# Patient Record
Sex: FEMALE | Race: BLACK OR AFRICAN AMERICAN | Employment: FULL TIME | URBAN - METROPOLITAN AREA
[De-identification: names, ages, dates, MRNs, and addresses within clinical notes are randomized per-mention and may not be internally consistent; named-entity substitution may affect disease eponyms.]

---

## 2017-04-07 ENCOUNTER — HOSPITAL ENCOUNTER (OUTPATIENT)
Dept: MAMMOGRAPHY | Age: 52
Discharge: HOME | End: 2017-04-07
Attending: FAMILY MEDICINE
Payer: COMMERCIAL

## 2017-04-07 DIAGNOSIS — Z12.31 VISIT FOR SCREENING MAMMOGRAM: ICD-10-CM

## 2017-04-07 PROCEDURE — 77067 SCR MAMMO BI INCL CAD: CPT

## 2018-04-19 ENCOUNTER — HOSPITAL ENCOUNTER (OUTPATIENT)
Dept: MAMMOGRAPHY | Age: 53
Discharge: HOME | End: 2018-04-19
Attending: FAMILY MEDICINE
Payer: COMMERCIAL

## 2018-04-19 DIAGNOSIS — Z12.31 VISIT FOR SCREENING MAMMOGRAM: ICD-10-CM

## 2018-04-19 PROCEDURE — 77067 SCR MAMMO BI INCL CAD: CPT

## 2019-05-20 PROBLEM — M54.50 CHRONIC LEFT-SIDED LOW BACK PAIN WITHOUT SCIATICA: Status: ACTIVE | Noted: 2018-07-30

## 2019-05-20 PROBLEM — G89.29 CHRONIC LEFT-SIDED LOW BACK PAIN WITHOUT SCIATICA: Status: ACTIVE | Noted: 2018-07-30

## 2019-05-29 PROBLEM — Z23 ENCOUNTER FOR IMMUNIZATION: Status: ACTIVE | Noted: 2019-05-29

## 2019-05-29 PROBLEM — Z00.00 PREVENTATIVE HEALTH CARE: Status: ACTIVE | Noted: 2019-05-29

## 2019-05-29 PROBLEM — Z11.59 ENCOUNTER FOR HEPATITIS C SCREENING TEST FOR LOW RISK PATIENT: Status: ACTIVE | Noted: 2019-05-29

## 2019-06-05 PROBLEM — N39.0 URINARY TRACT INFECTION WITHOUT HEMATURIA: Status: ACTIVE | Noted: 2019-06-05

## 2019-06-05 PROBLEM — M25.561 CHRONIC PAIN OF BOTH KNEES: Status: ACTIVE | Noted: 2019-06-05

## 2019-06-05 PROBLEM — M25.562 CHRONIC PAIN OF BOTH KNEES: Status: ACTIVE | Noted: 2019-06-05

## 2019-06-05 PROBLEM — G89.29 CHRONIC PAIN OF BOTH KNEES: Status: ACTIVE | Noted: 2019-06-05

## 2019-06-21 PROBLEM — Z76.0 MEDICATION REFILL: Status: ACTIVE | Noted: 2019-05-29

## 2019-06-21 PROBLEM — Z01.30 BP CHECK: Status: ACTIVE | Noted: 2019-06-21

## 2019-09-19 PROBLEM — Z01.30 BP CHECK: Status: RESOLVED | Noted: 2019-06-21 | Resolved: 2019-09-19

## 2019-10-25 PROBLEM — Z01.30 BP CHECK: Status: RESOLVED | Noted: 2019-06-21 | Resolved: 2019-10-25

## 2019-12-03 PROBLEM — F17.211 CIGARETTE NICOTINE DEPENDENCE IN REMISSION: Status: ACTIVE | Noted: 2019-12-03

## 2020-01-06 PROBLEM — H65.91 MIDDLE EAR EFFUSION, RIGHT: Status: ACTIVE | Noted: 2020-01-06

## 2020-02-07 PROBLEM — H66.91 RIGHT CHRONIC OTITIS MEDIA: Status: ACTIVE | Noted: 2020-02-07

## 2020-05-13 PROBLEM — Z00.00 PREVENTATIVE HEALTH CARE: Status: ACTIVE | Noted: 2020-05-13

## 2020-06-12 PROBLEM — Z01.30 BP CHECK: Status: RESOLVED | Noted: 2019-06-21 | Resolved: 2020-06-12

## 2020-06-12 PROBLEM — Z00.00 PREVENTATIVE HEALTH CARE: Status: RESOLVED | Noted: 2020-05-13 | Resolved: 2020-06-12

## 2021-04-23 ENCOUNTER — TRANSCRIBE ORDER (OUTPATIENT)
Dept: SCHEDULING | Age: 56
End: 2021-04-23

## 2021-04-23 DIAGNOSIS — Z12.31 SCREENING MAMMOGRAM FOR HIGH-RISK PATIENT: Primary | ICD-10-CM

## 2021-04-29 ENCOUNTER — HOSPITAL ENCOUNTER (OUTPATIENT)
Dept: MAMMOGRAPHY | Age: 56
Discharge: HOME | End: 2021-04-29
Attending: FAMILY MEDICINE
Payer: COMMERCIAL

## 2021-04-29 DIAGNOSIS — Z12.31 SCREENING MAMMOGRAM FOR HIGH-RISK PATIENT: ICD-10-CM

## 2021-04-29 PROCEDURE — 77063 BREAST TOMOSYNTHESIS BI: CPT

## 2022-03-18 PROBLEM — G89.29 CHRONIC LEFT-SIDED LOW BACK PAIN WITHOUT SCIATICA: Status: ACTIVE | Noted: 2018-07-30

## 2022-03-18 PROBLEM — M25.561 CHRONIC PAIN OF BOTH KNEES: Status: ACTIVE | Noted: 2019-06-05

## 2022-03-18 PROBLEM — M25.562 CHRONIC PAIN OF BOTH KNEES: Status: ACTIVE | Noted: 2019-06-05

## 2022-03-18 PROBLEM — G89.29 CHRONIC PAIN OF BOTH KNEES: Status: ACTIVE | Noted: 2019-06-05

## 2022-03-18 PROBLEM — M54.50 CHRONIC LEFT-SIDED LOW BACK PAIN WITHOUT SCIATICA: Status: ACTIVE | Noted: 2018-07-30

## 2022-03-19 PROBLEM — Z76.0 MEDICATION REFILL: Status: ACTIVE | Noted: 2019-05-29

## 2022-03-19 PROBLEM — F17.211 CIGARETTE NICOTINE DEPENDENCE IN REMISSION: Status: ACTIVE | Noted: 2019-12-03

## 2022-03-19 PROBLEM — N39.0 URINARY TRACT INFECTION WITHOUT HEMATURIA: Status: ACTIVE | Noted: 2019-06-05

## 2022-03-19 PROBLEM — H66.91 RIGHT CHRONIC OTITIS MEDIA: Status: ACTIVE | Noted: 2020-02-07

## 2022-03-19 PROBLEM — H65.91 MIDDLE EAR EFFUSION, RIGHT: Status: ACTIVE | Noted: 2020-01-06

## 2022-05-24 ENCOUNTER — HOSPITAL ENCOUNTER (OUTPATIENT)
Dept: MAMMOGRAPHY | Age: 57
Discharge: HOME | End: 2022-05-27
Payer: COMMERCIAL

## 2022-05-24 DIAGNOSIS — Z12.31 VISIT FOR SCREENING MAMMOGRAM: ICD-10-CM

## 2022-05-24 PROCEDURE — 77063 BREAST TOMOSYNTHESIS BI: CPT

## 2022-10-17 ENCOUNTER — OFFICE VISIT (OUTPATIENT)
Dept: OCCUPATIONAL MEDICINE | Age: 57
End: 2022-10-17

## 2022-10-17 VITALS — DIASTOLIC BLOOD PRESSURE: 80 MMHG | TEMPERATURE: 98.2 F | SYSTOLIC BLOOD PRESSURE: 110 MMHG

## 2022-10-17 DIAGNOSIS — R30.9 URINARY PAIN: Primary | ICD-10-CM

## 2022-10-17 LAB
BILIRUBIN, URINE, POC: NEGATIVE
BLOOD URINE, POC: NEGATIVE
GLUCOSE URINE, POC: NEGATIVE
KETONES, URINE, POC: NEGATIVE
LEUKOCYTE ESTERASE, URINE, POC: NEGATIVE
NITRITE, URINE, POC: NORMAL
PH, URINE, POC: 8 (ref 4.6–8)
PROTEIN,URINE, POC: NORMAL
SPECIFIC GRAVITY, URINE, POC: 1.03 (ref 1–1.03)
URINALYSIS CLARITY, POC: CLEAR
URINALYSIS COLOR, POC: YELLOW
UROBILINOGEN, POC: NORMAL

## 2022-10-17 RX ORDER — TIZANIDINE 2 MG/1
2 TABLET ORAL 2 TIMES DAILY PRN
COMMUNITY
Start: 2021-12-30

## 2022-10-17 RX ORDER — NITROFURANTOIN 25; 75 MG/1; MG/1
100 CAPSULE ORAL 2 TIMES DAILY
Qty: 10 CAPSULE | Refills: 0 | Status: SHIPPED | OUTPATIENT
Start: 2022-10-17 | End: 2022-10-22

## 2022-10-17 ASSESSMENT — ENCOUNTER SYMPTOMS
GASTROINTESTINAL NEGATIVE: 1
ALLERGIC/IMMUNOLOGIC NEGATIVE: 1
EYES NEGATIVE: 1
RESPIRATORY NEGATIVE: 1

## 2022-10-17 NOTE — PROGRESS NOTES
PROGRESS NOTE    SUBJECTIVE:   Tom Chaidez is a 59 y.o. female seen for UTI x 1 week. Patient tried to come in to see me last week, but never got a chance to leave work. Associated symptoms include urinary pain/pressure, frequency, urgency, odorous urine, and left flank pain. Denies fevers, N/V, abdominal pain, discharge, itching, & blood in urine. She has not taken any medication for acute symptom management. Last UTI was at the beginning of this year. She has taken Bactrim in the past for UTIs and this does not work well for her. No other questions or concerns. Chief Complaint    Urinary Tract Infection       Urinary Tract Infection  This is a new problem. The current episode started 1 to 4 weeks ago. The problem is unchanged. Pertinent negatives include no hematuria or pain. Current Outpatient Medications   Medication Sig Dispense Refill    tiZANidine (ZANAFLEX) 2 MG tablet Take 2 mg by mouth 2 times daily as needed      nitrofurantoin, macrocrystal-monohydrate, (MACROBID) 100 MG capsule Take 1 capsule by mouth 2 times daily for 5 days 10 capsule 0     No current facility-administered medications for this visit. No Known Allergies    Social History     Tobacco Use    Smoking status: Former     Packs/day: 1.00     Types: Cigarettes     Quit date: 2014     Years since quittin.7    Smokeless tobacco: Never   Vaping Use    Vaping Use: Never used   Substance Use Topics    Alcohol use: No      Review of Systems   Constitutional: Negative. HENT: Negative. Eyes: Negative. Respiratory: Negative. Cardiovascular: Negative. Gastrointestinal: Negative. Endocrine: Negative. Genitourinary:  Positive for dysuria, flank pain, frequency and urgency. Negative for difficulty urinating, hematuria, pelvic pain, vaginal bleeding and vaginal discharge. Odorous urine   Skin: Negative. Allergic/Immunologic: Negative. Neurological: Negative. Hematological: Negative. Psychiatric/Behavioral: Negative. OBJECTIVE:  /80 (Site: Left Upper Arm, Position: Sitting)   Temp 98.2 °F (36.8 °C)      Results for orders placed or performed in visit on 10/17/22   AMB POC URINALYSIS DIP STICK MANUAL W/O MICRO   Result Value Ref Range    Color (UA POC) Yellow     Clarity (UA POC) Clear     Glucose, Urine, POC Negative Negative    Bilirubin, Urine, POC Negative Negative    Ketones, Urine, POC Negative Negative    Specific Gravity, Urine, POC 1.030 1.001 - 1.035    Blood (UA POC) Negative Negative    pH, Urine, POC 8.0 4.6 - 8.0    Protein, Urine, POC 4+ Negative    Urobilinogen, POC 0.2 mg/dL     Nitrite, Urine, POC Neg-culture sent Negative    Leukocyte Esterase, Urine, POC Negative Negative     Physical Exam  Constitutional:       General: She is not in acute distress. Appearance: Normal appearance. HENT:      Head: Normocephalic and atraumatic. Cardiovascular:      Pulses: Normal pulses. Pulmonary:      Effort: Pulmonary effort is normal.   Abdominal:      Tenderness: There is no right CVA tenderness or left CVA tenderness. Skin:     General: Skin is warm and dry. Neurological:      General: No focal deficit present. Mental Status: She is alert and oriented to person, place, and time. Psychiatric:         Mood and Affect: Mood normal.         Behavior: Behavior normal.     ASSESSMENT and Syhira Grimmsudha was seen today for urinary tract infection. Diagnoses and all orders for this visit:    Urinary pain  -     nitrofurantoin, macrocrystal-monohydrate, (MACROBID) 100 MG capsule; Take 1 capsule by mouth 2 times daily for 5 days  -     AMB POC URINALYSIS DIP STICK MANUAL W/O MICRO    Urine dipstick negative; however, patient is symptomatic and would like to take an antibiotic. Take antibiotic with food to avoid GI upset. Urine sent to [facility] for culture. Will call patient with results in 3-4 days. Drink plenty of water to flush urinary tract.  To ER/urgent care with worsening symptoms such as pelvic/abdominal/flank pain, fevers, N/V, chills. RTC PRN for questions or concerns. Counseled on benefits of having a primary care provider which includes, but is not limited to, continuity of care and having a medical home when concerns arise. Also enforced that onsite clinic policy states that we are not to take the place of a primary care provider, pt verbalized understanding. SEs and risk vs benefits associated with medications prescribed discussed with patient who verbalized understanding. Pt verbalized understanding and agreement with plan of care. RTC for persisting/worsening symptoms or new complaints that arise. Discussed signs and symptoms that would warrant immediate evaluation including, but not limited to HA, blurred vision, speech disturbance, difficulty with ambulation/gait, numbness, tingling, weakness, syncope, chest pain, or shortness of breath. I have reviewed the patient's medication list, past medical, family, social, and surgical history in detail and updated the patient record appropriately.     Orlando Boo, APRN - CNP

## 2022-11-21 ENCOUNTER — OFFICE VISIT (OUTPATIENT)
Dept: OCCUPATIONAL MEDICINE | Age: 57
End: 2022-11-21

## 2022-11-21 DIAGNOSIS — S40.861A INSECT BITE OF RIGHT UPPER ARM, INITIAL ENCOUNTER: Primary | ICD-10-CM
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
## 2022-11-21 ASSESSMENT — ENCOUNTER SYMPTOMS
GASTROINTESTINAL NEGATIVE: 1
ROS SKIN COMMENTS: BUG BITE
RESPIRATORY NEGATIVE: 1
EYES NEGATIVE: 1
ALLERGIC/IMMUNOLOGIC NEGATIVE: 1

## 2022-11-21 NOTE — PROGRESS NOTES
PROGRESS NOTE    SUBJECTIVE:   Praveena Moscoso is a 59 y.o. female seen for bug bite on right arm x 2 days. Patient states she's not sure what big her. The area is red and itchy. No fevers, ulceration, warmth, drainage, and/or bulls eye rash. Patient admits to scratching the bite often. She applied Benadryl cream but this did not help much with the itch. Insect Bite  This is a new problem. The problem has been gradually worsening. Associated symptoms include a rash. Pertinent negatives include no chills or fever. Nothing aggravates the symptoms. Treatments tried: OTC Benadryl Cream. The treatment provided no relief. Current Outpatient Medications   Medication Sig Dispense Refill    tiZANidine (ZANAFLEX) 2 MG tablet Take 2 mg by mouth 2 times daily as needed       No current facility-administered medications for this visit. No Known Allergies    Social History     Tobacco Use    Smoking status: Former     Packs/day: 1.00     Types: Cigarettes     Quit date: 2014     Years since quittin.8    Smokeless tobacco: Never   Vaping Use    Vaping Use: Never used   Substance Use Topics    Alcohol use: No      Review of Systems   Constitutional:  Negative for chills and fever. HENT: Negative. Eyes: Negative. Respiratory: Negative. Cardiovascular: Negative. Gastrointestinal: Negative. Endocrine: Negative. Genitourinary: Negative. Musculoskeletal: Negative. Skin:  Positive for rash. Bug bite   Allergic/Immunologic: Negative. Neurological: Negative. Hematological: Negative. Psychiatric/Behavioral: Negative. OBJECTIVE:    Physical Exam  Constitutional:       General: She is not in acute distress. Appearance: Normal appearance. She is not ill-appearing. Pulmonary:      Effort: Pulmonary effort is normal.   Skin:     General: Skin is warm and dry. Findings: Erythema present.              Comments: 3 cm circular area of erythema with excoriation on right deltoid. No punctate lesions, erythema migrans, streaking, warmth, or drainage noted. Neurological:      Mental Status: She is alert and oriented to person, place, and time. Psychiatric:         Mood and Affect: Mood normal.         Behavior: Behavior normal.     ASSESSMENT and PLAN    Diagnoses and all orders for this visit:    Insect bite of right upper arm, initial encounter      Apply OTC Hydrocortisone 1% BID x 2 days. RTC on Wednesday for recheck. To urgent care with increased swelling, redness, itching, warmth, and/or fevers. Counseled on benefits of having a primary care provider which includes, but is not limited to, continuity of care and having a medical home when concerns arise. Also enforced that onsite clinic policy states that we are not to take the place of a primary care provider, pt verbalized understanding. SEs and risk vs benefits associated with medications prescribed discussed with patient who verbalized understanding. Pt verbalized understanding and agreement with plan of care. RTC for persisting/worsening symptoms or new complaints that arise. Discussed signs and symptoms that would warrant immediate evaluation including, but not limited to HA, blurred vision, speech disturbance, difficulty with ambulation/gait, numbness, tingling, weakness, syncope, chest pain, or shortness of breath. I have reviewed the patient's medication list, past medical, family, social, and surgical history in detail and updated the patient record appropriately.     KATLYN Okeefe - CNP

## 2022-11-28 ENCOUNTER — OFFICE VISIT (OUTPATIENT)
Dept: OCCUPATIONAL MEDICINE | Age: 57
End: 2022-11-28

## 2022-11-28 VITALS — OXYGEN SATURATION: 97 % | HEART RATE: 82 BPM | SYSTOLIC BLOOD PRESSURE: 118 MMHG | DIASTOLIC BLOOD PRESSURE: 78 MMHG

## 2022-11-28 DIAGNOSIS — L29.9 PRURITUS OF FOREARM: ICD-10-CM

## 2022-11-28 DIAGNOSIS — L03.113 CELLULITIS OF RIGHT UPPER ARM: Primary | ICD-10-CM

## 2022-11-28 RX ORDER — PREDNISONE 20 MG/1
20 TABLET ORAL DAILY
Qty: 5 TABLET | Refills: 0 | Status: SHIPPED | OUTPATIENT
Start: 2022-11-28 | End: 2022-12-03

## 2022-11-28 RX ORDER — HYDROXYZINE HYDROCHLORIDE 25 MG/1
25 TABLET, FILM COATED ORAL NIGHTLY PRN
Qty: 7 TABLET | Refills: 0 | Status: SHIPPED | OUTPATIENT
Start: 2022-11-28 | End: 2022-12-05

## 2022-11-28 RX ORDER — DOXYCYCLINE HYCLATE 100 MG
100 TABLET ORAL 2 TIMES DAILY
Qty: 14 TABLET | Refills: 0 | Status: SHIPPED | OUTPATIENT
Start: 2022-11-28 | End: 2022-12-05

## 2022-11-28 ASSESSMENT — ENCOUNTER SYMPTOMS: COLOR CHANGE: 0

## 2022-11-28 NOTE — PROGRESS NOTES
PROGRESS NOTE    SUBJECTIVE:   Luna Proctor is a 59 y.o. female seen for rash. Chief Complaint    Rash         Ms. Emily Rangel returns to the clinic after being seen last week for a rash on her right upper arm. She relays that she has been using the OTC hydrocortisone cream and Benadryl for itching without relief. She now endorses swelling to her right middle and index fingers with a rash-like place on her right wrist. She is unsure if she was bitten or exposed to something new. She denies fever, streaking, or swelling to arm. Rash  This is a new problem. The current episode started 1 to 4 weeks ago. The problem is unchanged. The affected locations include the right arm. The rash is characterized by itchiness, redness and dryness. It is unknown if there was an exposure to a precipitant. Pertinent negatives include no fever. Past treatments include anti-itch cream and topical steroids. The treatment provided no relief. Current Outpatient Medications   Medication Sig Dispense Refill    doxycycline hyclate (VIBRA-TABS) 100 MG tablet Take 1 tablet by mouth 2 times daily for 7 days 14 tablet 0    predniSONE (DELTASONE) 20 MG tablet Take 1 tablet by mouth daily for 5 days 5 tablet 0    hydrOXYzine HCl (ATARAX) 25 MG tablet Take 1 tablet by mouth nightly as needed for Itching 7 tablet 0    tiZANidine (ZANAFLEX) 2 MG tablet Take 2 mg by mouth 2 times daily as needed       No current facility-administered medications for this visit. No Known Allergies    Social History     Tobacco Use    Smoking status: Former     Packs/day: 1.00     Types: Cigarettes     Quit date: 2014     Years since quittin.9    Smokeless tobacco: Never   Vaping Use    Vaping Use: Never used   Substance Use Topics    Alcohol use: No        Review of Systems   Constitutional:  Negative for chills and fever. Skin:  Positive for rash. Negative for color change. Denies drainage, edema, streaking to entire arm.  Endorses swelling to right middle and index fingers. OBJECTIVE:  /78 (Site: Left Upper Arm)   Pulse 82   SpO2 97%      No results found for this visit on 11/28/22. Physical Exam  Constitutional:       Appearance: Normal appearance. Cardiovascular:      Rate and Rhythm: Normal rate and regular rhythm. Pulmonary:      Effort: Pulmonary effort is normal.   Skin:            Comments: There is an erythematous, macular rash to the right upper arm. There appears to be some discoloration of the skin over the site. Excoriation noted. Right index and middle fingers are slightly swollen. There is erythema to her right wrist area. No drainage noted to arm. No generalized edema to arm. Pulse intact +2 radial.    Neurological:      Mental Status: She is alert. ASSESSMENT and Ekta Schroeder was seen today for rash. Diagnoses and all orders for this visit:    Cellulitis of right upper arm  Comments:  Doxycycline and Prednisone prescribed. RTC on Wednesday for recheck. Orders:  -     doxycycline hyclate (VIBRA-TABS) 100 MG tablet; Take 1 tablet by mouth 2 times daily for 7 days  -     predniSONE (DELTASONE) 20 MG tablet; Take 1 tablet by mouth daily for 5 days    Pruritus of forearm  Comments:  Atarax at night. Can use Benadryl during the day-patient relays it does not make her drowsy. Orders:  -     hydrOXYzine HCl (ATARAX) 25 MG tablet; Take 1 tablet by mouth nightly as needed for Itching    Possible cellulitis forming at right upper arm. Reviewed Doxycycline and Prednisone with patient along with side effects. Reviewed Atarax-take at night only. Stop OTC hydrocortisone cream. RTC in two days. Go to ER if experiencing any of the following but not limited to: fever, streaking, swelling of hand/arm, new onset pain to arm. Patient verbalized understanding.      Counseled on benefits of having a primary care provider which includes, but is not limited to, continuity of care and having a medical home when concerns arise. Also enforced that onsite clinic policy states that we are not to take the place of a primary care provider, pt verbalized understanding. SEs and risk vs benefits associated with medications prescribed discussed with patient who verbalized understanding. Pt verbalized understanding and agreement with plan of care. RTC for persisting/worsening symptoms or new complaints that arise. Discussed signs and symptoms that would warrant immediate evaluation including, but not limited to HA, blurred vision, speech disturbance, difficulty with ambulation/gait, numbness, tingling, weakness, syncope, chest pain, or shortness of breath. I have reviewed the patient's medication list, past medical, family, social, and surgical history in detail and updated the patient record appropriately.     Alfred Martinez, KATLYN - CNP

## 2022-12-05 ENCOUNTER — OFFICE VISIT (OUTPATIENT)
Dept: OCCUPATIONAL MEDICINE | Age: 57
End: 2022-12-05

## 2022-12-05 VITALS
TEMPERATURE: 98.5 F | SYSTOLIC BLOOD PRESSURE: 124 MMHG | HEART RATE: 78 BPM | OXYGEN SATURATION: 98 % | DIASTOLIC BLOOD PRESSURE: 80 MMHG

## 2022-12-05 DIAGNOSIS — R05.1 ACUTE COUGH: ICD-10-CM

## 2022-12-05 DIAGNOSIS — J30.2 SEASONAL ALLERGIES: Primary | ICD-10-CM

## 2022-12-05 DIAGNOSIS — R09.81 NASAL CONGESTION: ICD-10-CM

## 2022-12-05 LAB
INFLUENZA A ANTIGEN, POC: NEGATIVE
INFLUENZA B ANTIGEN, POC: NEGATIVE
SARS-COV-2 RNA, POC: NEGATIVE

## 2022-12-05 RX ORDER — FLUTICASONE PROPIONATE 50 MCG
2 SPRAY, SUSPENSION (ML) NASAL DAILY
Qty: 16 G | Refills: 0 | Status: SHIPPED | OUTPATIENT
Start: 2022-12-05

## 2022-12-05 ASSESSMENT — ENCOUNTER SYMPTOMS
SWOLLEN GLANDS: 0
HOARSE VOICE: 0
COUGH: 1
SINUS PAIN: 0
EYE ITCHING: 1
SORE THROAT: 0
SINUS PRESSURE: 0
WHEEZING: 0
SHORTNESS OF BREATH: 0
GASTROINTESTINAL NEGATIVE: 1
RHINORRHEA: 1
CHEST TIGHTNESS: 0

## 2022-12-05 NOTE — PROGRESS NOTES
PROGRESS NOTE    SUBJECTIVE:   Antoine Mei is a 59 y.o. female seen for sneezing, nasal congestion, and watery eyes that started yesterday. Patient would like to tested for COVID/FLU. Chief Complaint    Allergies       Allergies  Presents for initial visit. She complains of congestion, cough, eye itching, itchy nose, rhinorrhea and sneezing. She reports no ear pain, fatigue, fever, headaches, hoarse voice, plugged ear sensation, rash, sinus pressure, sore throat, swollen glands or wheezing. The problem occurs constantly. Symptom severity has been mild. Symptoms are perennial with seasonal flares. Current Outpatient Medications   Medication Sig Dispense Refill    fluticasone (FLONASE) 50 MCG/ACT nasal spray 2 sprays by Each Nostril route daily 16 g 0    doxycycline hyclate (VIBRA-TABS) 100 MG tablet Take 1 tablet by mouth 2 times daily for 7 days 14 tablet 0    tiZANidine (ZANAFLEX) 2 MG tablet Take 2 mg by mouth 2 times daily as needed      hydrOXYzine HCl (ATARAX) 25 MG tablet Take 1 tablet by mouth nightly as needed for Itching (Patient not taking: Reported on 2022) 7 tablet 0     No current facility-administered medications for this visit. No Known Allergies    Social History     Tobacco Use    Smoking status: Former     Packs/day: 1.00     Types: Cigarettes     Quit date: 2014     Years since quittin.9    Smokeless tobacco: Never   Vaping Use    Vaping Use: Never used   Substance Use Topics    Alcohol use: No      Review of Systems   Constitutional: Negative. Negative for fatigue and fever. HENT:  Positive for congestion, postnasal drip, rhinorrhea and sneezing. Negative for ear discharge, ear pain, hoarse voice, sinus pressure, sinus pain and sore throat. Eyes:  Positive for itching. Respiratory:  Positive for cough. Negative for chest tightness, shortness of breath and wheezing. Cardiovascular: Negative. Gastrointestinal: Negative. Endocrine: Negative. Genitourinary: Negative. Musculoskeletal: Negative. Skin:  Negative for rash. Allergic/Immunologic: Positive for environmental allergies. Neurological:  Negative for headaches. Hematological: Negative. Psychiatric/Behavioral: Negative. OBJECTIVE:  /80 (Site: Left Upper Arm, Position: Sitting)   Pulse 78   Temp 98.5 °F (36.9 °C) (Oral)   SpO2 98%      Results for orders placed or performed in visit on 12/05/22   AMB POC SARS-COV-2 AND INFLUENZA A/B   Result Value Ref Range    SARS-COV-2 RNA, POC Negative     Influenza A Antigen, POC Negative Negative    Influenza B Antigen, POC Negative Negative     Physical Exam  Vitals reviewed. Constitutional:       Appearance: Normal appearance. HENT:      Head: Normocephalic and atraumatic. Right Ear: Ear canal and external ear normal.      Left Ear: Ear canal and external ear normal.      Nose: Congestion present. No rhinorrhea. Mouth/Throat:      Mouth: Mucous membranes are moist.      Pharynx: Oropharynx is clear. No oropharyngeal exudate or posterior oropharyngeal erythema. Eyes:      Conjunctiva/sclera: Conjunctivae normal.   Cardiovascular:      Rate and Rhythm: Normal rate and regular rhythm. Pulses: Normal pulses. Heart sounds: Normal heart sounds. Skin:     General: Skin is warm and dry. Neurological:      Mental Status: She is alert and oriented to person, place, and time. Psychiatric:         Mood and Affect: Mood normal.         Behavior: Behavior normal.     ASSESSMENT and Shelley Vazquez was seen today for allergies. Diagnoses and all orders for this visit:    Seasonal allergies  -     fluticasone (FLONASE) 50 MCG/ACT nasal spray; 2 sprays by Each Nostril route daily    Nasal congestion  -     AMB POC SARS-COV-2 AND INFLUENZA A/B    Acute cough  -     AMB POC SARS-COV-2 AND INFLUENZA A/B    COVID/FLU negative. Recommend Flonase per packaging instructions.  Can try honey (1 tsp) TID as needed for cough. RTC if symptoms worsen or fail to improve. Counseled on benefits of having a primary care provider which includes, but is not limited to, continuity of care and having a medical home when concerns arise. Also enforced that onsite clinic policy states that we are not to take the place of a primary care provider, pt verbalized understanding. SEs and risk vs benefits associated with medications prescribed discussed with patient who verbalized understanding. Pt verbalized understanding and agreement with plan of care. RTC for persisting/worsening symptoms or new complaints that arise. Discussed signs and symptoms that would warrant immediate evaluation including, but not limited to HA, blurred vision, speech disturbance, difficulty with ambulation/gait, numbness, tingling, weakness, syncope, chest pain, or shortness of breath. I have reviewed the patient's medication list, past medical, family, social, and surgical history in detail and updated the patient record appropriately.     Rebecca Gilbert, APRN - CNP

## 2023-02-06 ENCOUNTER — OFFICE VISIT (OUTPATIENT)
Dept: OCCUPATIONAL MEDICINE | Age: 58
End: 2023-02-06

## 2023-02-06 VITALS
DIASTOLIC BLOOD PRESSURE: 80 MMHG | OXYGEN SATURATION: 97 % | SYSTOLIC BLOOD PRESSURE: 100 MMHG | TEMPERATURE: 98.3 F | HEART RATE: 91 BPM

## 2023-02-06 DIAGNOSIS — J06.9 UPPER RESPIRATORY TRACT INFECTION, UNSPECIFIED TYPE: Primary | ICD-10-CM

## 2023-02-06 DIAGNOSIS — R05.1 ACUTE COUGH: ICD-10-CM

## 2023-02-06 RX ORDER — CETIRIZINE HYDROCHLORIDE 10 MG/1
10 TABLET ORAL DAILY
COMMUNITY

## 2023-02-06 ASSESSMENT — ENCOUNTER SYMPTOMS
RHINORRHEA: 1
SHORTNESS OF BREATH: 0
VOMITING: 0
SWOLLEN GLANDS: 0
COUGH: 1
DIARRHEA: 0
EYES NEGATIVE: 1
SORE THROAT: 1
SINUS PAIN: 0
NAUSEA: 0
ABDOMINAL PAIN: 0
WHEEZING: 0
CHEST TIGHTNESS: 0
SINUS PRESSURE: 0

## 2023-02-06 NOTE — PROGRESS NOTES
PROGRESS NOTE    SUBJECTIVE:     Sandra Lima is a 59 y.o. female seen for:    Chief Complaint    Cough       URI   Associated symptoms include coughing, headaches, rhinorrhea, sneezing and a sore throat. Pertinent negatives include no abdominal pain, chest pain, congestion, diarrhea, dysuria, ear pain, joint pain, joint swelling, nausea, neck pain, plugged ear sensation, rash, sinus pain, swollen glands, vomiting or wheezing. She has tried acetaminophen for the symptoms. The treatment provided mild relief. Patient's most bothersome symptom is cough with sputum. No history of lung disease. Patient has been around a sick co-worker. Current Outpatient Medications   Medication Sig Dispense Refill    cetirizine (ZYRTEC) 10 MG tablet Take 10 mg by mouth daily      fluticasone (FLONASE) 50 MCG/ACT nasal spray 2 sprays by Each Nostril route daily 16 g 0    tiZANidine (ZANAFLEX) 2 MG tablet Take 2 mg by mouth 2 times daily as needed       No current facility-administered medications for this visit. No Known Allergies    Social History     Tobacco Use    Smoking status: Former     Packs/day: 1.00     Types: Cigarettes     Quit date: 2014     Years since quittin.1    Smokeless tobacco: Never   Vaping Use    Vaping Use: Never used   Substance Use Topics    Alcohol use: No      Review of Systems   Constitutional: Negative. HENT:  Positive for rhinorrhea, sneezing and sore throat. Negative for congestion, ear discharge, ear pain, postnasal drip, sinus pressure and sinus pain. Eyes: Negative. Respiratory:  Positive for cough. Negative for chest tightness, shortness of breath and wheezing. Cardiovascular:  Negative for chest pain. Gastrointestinal:  Negative for abdominal pain, diarrhea, nausea and vomiting. Endocrine: Negative. Genitourinary:  Negative for dysuria. Musculoskeletal:  Negative for arthralgias, joint pain, myalgias and neck pain. Skin:  Negative for rash. Allergic/Immunologic: Positive for environmental allergies. Neurological:  Positive for headaches. Hematological: Negative. Psychiatric/Behavioral: Negative. OBJECTIVE:    /80 (Site: Left Upper Arm, Position: Sitting)   Pulse 91   Temp 98.3 °F (36.8 °C) (Oral)   SpO2 97%      Results for orders placed or performed in visit on 02/06/23   AMB POC SARS-COV-2 AND INFLUENZA A/B   Result Value Ref Range    SARS-COV-2 RNA, POC Negative     Influenza A Antigen, POC Negative Negative    Influenza B Antigen, POC Negative Negative     Physical Exam  Vitals reviewed. Constitutional:       General: She is not in acute distress. Appearance: Normal appearance. HENT:      Head: Normocephalic and atraumatic. Right Ear: Tympanic membrane, ear canal and external ear normal.      Left Ear: Tympanic membrane, ear canal and external ear normal.      Nose: Congestion and rhinorrhea present. Mouth/Throat:      Mouth: Mucous membranes are moist.      Pharynx: Oropharynx is clear. Posterior oropharyngeal erythema present. No oropharyngeal exudate. Eyes:      Conjunctiva/sclera: Conjunctivae normal.   Cardiovascular:      Rate and Rhythm: Normal rate and regular rhythm. Pulses: Normal pulses. Heart sounds: Normal heart sounds. Pulmonary:      Effort: Pulmonary effort is normal.      Breath sounds: Normal breath sounds. Skin:     General: Skin is warm and dry. Neurological:      Mental Status: She is alert and oriented to person, place, and time. Psychiatric:         Mood and Affect: Mood normal.         Behavior: Behavior normal.     ASSESSMENT and Acosta David was seen today for cough. Diagnoses and all orders for this visit:    Upper respiratory tract infection, unspecified type    Acute cough  -     AMB POC SARS-COV-2 AND INFLUENZA A/B    COVID/FLU negative. Continue Flonase and Zyrtec for seasonal allergies.  Add Mucinex-DM to thin/loosen chest congestion and help with cough- drink plenty of water with this medication. May have tested too early- RTC on Wed for retest. Follow up with PCP for routine care. Counseled on benefits of having a primary care provider which includes, but is not limited to, continuity of care and having a medical home when concerns arise. Also enforced that onsite clinic policy states that we are not to take the place of a primary care provider, pt verbalized understanding. SEs and risk vs benefits associated with medications prescribed discussed with patient who verbalized understanding. Pt verbalized understanding and agreement with plan of care. RTC for persisting/worsening symptoms or new complaints that arise. Discussed signs and symptoms that would warrant immediate evaluation including, but not limited to HA, blurred vision, speech disturbance, difficulty with ambulation/gait, numbness, tingling, weakness, syncope, chest pain, or shortness of breath. I have reviewed the patient's medication list, past medical, family, social, and surgical history in detail and updated the patient record appropriately.     Juan Leach, APRN - CNP

## 2023-04-07 ENCOUNTER — TRANSCRIBE ORDERS (OUTPATIENT)
Dept: SCHEDULING | Age: 58
End: 2023-04-07

## 2023-04-07 DIAGNOSIS — Z12.31 VISIT FOR SCREENING MAMMOGRAM: Primary | ICD-10-CM

## 2023-06-30 LAB
25(OH)D3+25(OH)D2 SERPL-MCNC: 15.9 NG/ML (ref 30–100)
ALBUMIN SERPL-MCNC: 4.1 G/DL (ref 3.8–4.9)
ALBUMIN/GLOB SERPL: 1.4 {RATIO} (ref 1.2–2.2)
ALP SERPL-CCNC: 113 IU/L (ref 44–121)
ALT SERPL-CCNC: 13 IU/L (ref 0–32)
AST SERPL-CCNC: 16 IU/L (ref 0–40)
BASOPHILS # BLD AUTO: 0 X10E3/UL (ref 0–0.2)
BASOPHILS NFR BLD AUTO: 1 %
BILIRUB SERPL-MCNC: 0.4 MG/DL (ref 0–1.2)
BUN SERPL-MCNC: 11 MG/DL (ref 6–24)
BUN/CREAT SERPL: 18 (ref 9–23)
CALCIUM SERPL-MCNC: 9.2 MG/DL (ref 8.7–10.2)
CHLORIDE SERPL-SCNC: 107 MMOL/L (ref 96–106)
CHOLEST SERPL-MCNC: 243 MG/DL (ref 100–199)
CO2 SERPL-SCNC: 21 MMOL/L (ref 20–29)
CREAT SERPL-MCNC: 0.62 MG/DL (ref 0.57–1)
EGFRCR SERPLBLD CKD-EPI 2021: 103 ML/MIN/1.73
EOSINOPHIL # BLD AUTO: 0 X10E3/UL (ref 0–0.4)
EOSINOPHIL NFR BLD AUTO: 1 %
ERYTHROCYTE [DISTWIDTH] IN BLOOD BY AUTOMATED COUNT: 13.3 % (ref 11.7–15.4)
GLOBULIN SER CALC-MCNC: 3 G/DL (ref 1.5–4.5)
GLUCOSE SERPL-MCNC: 93 MG/DL (ref 70–99)
HBA1C MFR BLD: 5.8 % (ref 4.8–5.6)
HCT VFR BLD AUTO: 38.6 % (ref 34–46.6)
HDLC SERPL-MCNC: 64 MG/DL
HGB BLD-MCNC: 12.5 G/DL (ref 11.1–15.9)
IMM GRANULOCYTES # BLD AUTO: 0 X10E3/UL (ref 0–0.1)
IMM GRANULOCYTES NFR BLD AUTO: 0 %
LDLC SERPL CALC-MCNC: 154 MG/DL (ref 0–99)
LYMPHOCYTES # BLD AUTO: 1.4 X10E3/UL (ref 0.7–3.1)
LYMPHOCYTES NFR BLD AUTO: 42 %
MCH RBC QN AUTO: 29.5 PG (ref 26.6–33)
MCHC RBC AUTO-ENTMCNC: 32.4 G/DL (ref 31.5–35.7)
MCV RBC AUTO: 91 FL (ref 79–97)
MONOCYTES # BLD AUTO: 0.2 X10E3/UL (ref 0.1–0.9)
MONOCYTES NFR BLD AUTO: 5 %
NEUTROPHILS # BLD AUTO: 1.8 X10E3/UL (ref 1.4–7)
NEUTROPHILS NFR BLD AUTO: 51 %
PLATELET # BLD AUTO: 242 X10E3/UL (ref 150–450)
POTASSIUM SERPL-SCNC: 4.1 MMOL/L (ref 3.5–5.2)
PROT SERPL-MCNC: 7.1 G/DL (ref 6–8.5)
RBC # BLD AUTO: 4.24 X10E6/UL (ref 3.77–5.28)
SODIUM SERPL-SCNC: 144 MMOL/L (ref 134–144)
TRIGL SERPL-MCNC: 142 MG/DL (ref 0–149)
TSH SERPL DL<=0.005 MIU/L-ACNC: 0.53 UIU/ML (ref 0.45–4.5)
VLDLC SERPL CALC-MCNC: 25 MG/DL (ref 5–40)
WBC # BLD AUTO: 3.4 X10E3/UL (ref 3.4–10.8)

## 2023-07-05 ENCOUNTER — OFFICE VISIT (OUTPATIENT)
Dept: OCCUPATIONAL MEDICINE | Age: 58
End: 2023-07-05

## 2023-07-05 DIAGNOSIS — Z71.2 ENCOUNTER TO DISCUSS TEST RESULTS: Primary | ICD-10-CM

## 2023-07-05 DIAGNOSIS — R73.09 ELEVATED HEMOGLOBIN A1C: ICD-10-CM

## 2023-07-05 DIAGNOSIS — E78.00 ELEVATED CHOLESTEROL: ICD-10-CM

## 2023-07-05 DIAGNOSIS — E55.9 VITAMIN D DEFICIENCY: ICD-10-CM

## 2023-07-05 RX ORDER — ERGOCALCIFEROL 1.25 MG/1
50000 CAPSULE ORAL WEEKLY
Qty: 12 CAPSULE | Refills: 1 | Status: SHIPPED | OUTPATIENT
Start: 2023-07-05

## 2023-07-05 NOTE — PROGRESS NOTES
Granulocytes 0 Not Estab. %    Immature Grans (Abs) 0.0 0.0 - 0.1 x10E3/uL   Comprehensive Metabolic Panel   Result Value Ref Range    Glucose 93 70 - 99 mg/dL    BUN 11 6 - 24 mg/dL    Creatinine 0.62 0.57 - 1.00 mg/dL    Est, Glomerular Filtration Rate 103 >59 mL/min/1.73    BUN/Creatinine Ratio 18 9 - 23    Sodium 144 134 - 144 mmol/L    Potassium 4.1 3.5 - 5.2 mmol/L    Chloride 107 (H) 96 - 106 mmol/L    CO2 21 20 - 29 mmol/L    Calcium 9.2 8.7 - 10.2 mg/dL    Total Protein 7.1 6.0 - 8.5 g/dL    Albumin 4.1 3.8 - 4.9 g/dL    Globulin, Total 3.0 1.5 - 4.5 g/dL    Albumin/Globulin Ratio 1.4 1.2 - 2.2    Total Bilirubin 0.4 0.0 - 1.2 mg/dL    Alkaline Phosphatase 113 44 - 121 IU/L    AST 16 0 - 40 IU/L    ALT 13 0 - 32 IU/L   Lipid Panel   Result Value Ref Range    Cholesterol 243 (H) 100 - 199 mg/dL    Triglycerides 142 0 - 149 mg/dL    HDL 64 >39 mg/dL    VLDL Cholesterol Calculated 25 5 - 40 mg/dL    LDL Calculated 154 (H) 0 - 99 mg/dL   TSH reflex to T4F   Result Value Ref Range    TSH 0.532 0.450 - 4.500 uIU/mL   Hemoglobin A1C   Result Value Ref Range    Hemoglobin A1C 5.8 (H) 4.8 - 5.6 %   Vitamin D 25 Hydroxy   Result Value Ref Range    Vit D, 25-Hydroxy 15.9 (L) 30.0 - 100.0 ng/mL     Physical Exam  Constitutional:       General: She is not in acute distress. Appearance: Normal appearance. She is not ill-appearing, toxic-appearing or diaphoretic. Pulmonary:      Effort: Pulmonary effort is normal.   Neurological:      Mental Status: She is alert and oriented to person, place, and time. Psychiatric:         Mood and Affect: Mood normal.         Behavior: Behavior normal.     ASSESSMENT and Dudley Tate was seen today for discuss labs. Diagnoses and all orders for this visit:    Vitamin D deficiency  -vitamin D (ERGOCALCIFEROL) 1.25 MG (95944 UT) CAPS capsule;  Take 1 capsule by mouth once a week  -Recheck around 10/5/23    Elevated cholesterol  -ASCDV Risk: 3.6%, no statin recommended at this

## 2023-07-31 ENCOUNTER — OFFICE VISIT (OUTPATIENT)
Dept: OCCUPATIONAL MEDICINE | Age: 58
End: 2023-07-31

## 2023-07-31 VITALS — TEMPERATURE: 98.2 F

## 2023-07-31 DIAGNOSIS — R30.9 URINARY PAIN: ICD-10-CM

## 2023-07-31 DIAGNOSIS — N30.00 ACUTE CYSTITIS WITHOUT HEMATURIA: Primary | ICD-10-CM

## 2023-07-31 DIAGNOSIS — T36.95XA ANTIBIOTIC-INDUCED YEAST INFECTION: ICD-10-CM

## 2023-07-31 DIAGNOSIS — B37.9 ANTIBIOTIC-INDUCED YEAST INFECTION: ICD-10-CM

## 2023-07-31 LAB
BILIRUBIN, URINE, POC: NEGATIVE
BLOOD URINE, POC: ABNORMAL
GLUCOSE URINE, POC: NEGATIVE
KETONES, URINE, POC: ABNORMAL
LEUKOCYTE ESTERASE, URINE, POC: ABNORMAL
NITRITE, URINE, POC: ABNORMAL
PH, URINE, POC: 5 (ref 4.6–8)
PROTEIN,URINE, POC: ABNORMAL
SPECIFIC GRAVITY, URINE, POC: 1.03 (ref 1–1.03)
URINALYSIS CLARITY, POC: CLEAR
URINALYSIS COLOR, POC: YELLOW
UROBILINOGEN, POC: NORMAL

## 2023-07-31 RX ORDER — FLUCONAZOLE 150 MG/1
TABLET ORAL
Qty: 2 TABLET | Refills: 0 | Status: SHIPPED | OUTPATIENT
Start: 2023-07-31

## 2023-07-31 RX ORDER — NITROFURANTOIN 25; 75 MG/1; MG/1
100 CAPSULE ORAL 2 TIMES DAILY
Qty: 10 CAPSULE | Refills: 0 | Status: SHIPPED | OUTPATIENT
Start: 2023-07-31 | End: 2023-08-05

## 2023-07-31 ASSESSMENT — ENCOUNTER SYMPTOMS
RESPIRATORY NEGATIVE: 1
EYES NEGATIVE: 1
ALLERGIC/IMMUNOLOGIC NEGATIVE: 1
GASTROINTESTINAL NEGATIVE: 1

## 2023-07-31 NOTE — PROGRESS NOTES
no right CVA tenderness, left CVA tenderness, guarding or rebound. Skin:     General: Skin is warm and dry. Neurological:      Mental Status: She is alert and oriented to person, place, and time. Psychiatric:         Mood and Affect: Mood normal.         Behavior: Behavior normal.     ASSESSMENT and Jay Sierra was seen today for urinary tract infection. Diagnoses and all orders for this visit:    Acute cystitis without hematuria  -     nitrofurantoin, macrocrystal-monohydrate, (MACROBID) 100 MG capsule; Take 1 capsule by mouth 2 times daily for 5 days    Urinary pain  -     AMB POC URINALYSIS DIP STICK MANUAL W/O MICRO    Antibiotic-induced yeast infection  -     fluconazole (DIFLUCAN) 150 MG tablet; Take 1 tablet by mouth once, then take 1 tablet by mouth 72 hours later if symptoms persist.    Urinalysis = positive for trace leukocytes, blood, and protein. Urine sent for culture. Take Macrobid with food to avoid GI upset. Return to clinic if symptoms worsen or fail to improve with treatment. Instructed patient to follow up with Gynecologist for routine vaginal exam.     Fallon Khan on benefits of having a primary care provider which includes, but is not limited to, continuity of care and having a medical home when concerns arise. Also enforced that onsite clinic policy states that we are not to take the place of a primary care provider, pt verbalized understanding. SEs and risk vs benefits associated with medications prescribed discussed with patient who verbalized understanding. Pt verbalized understanding and agreement with plan of care. RTC for persisting/worsening symptoms or new complaints that arise. Discussed signs and symptoms that would warrant immediate evaluation including, but not limited to HA, blurred vision, speech disturbance, difficulty with ambulation/gait, numbness, tingling, weakness, syncope, chest pain, or shortness of breath.     I have reviewed the patient's medication list,

## 2023-08-02 LAB — BACTERIA UR CULT: NORMAL

## 2024-01-17 ENCOUNTER — OFFICE VISIT (OUTPATIENT)
Age: 59
End: 2024-01-17

## 2024-01-17 VITALS — HEART RATE: 70 BPM | OXYGEN SATURATION: 100 % | TEMPERATURE: 98.1 F

## 2024-01-17 DIAGNOSIS — J06.9 VIRAL UPPER RESPIRATORY TRACT INFECTION: Primary | ICD-10-CM

## 2024-01-17 LAB
EXP DATE SOLUTION: NORMAL
EXP DATE SWAB: NORMAL
EXPIRATION DATE: NORMAL
LOT NUMBER POC: NORMAL
LOT NUMBER SOLUTION: NORMAL
LOT NUMBER SWAB: NORMAL
SARS-COV-2 RNA, POC: NEGATIVE

## 2024-01-17 NOTE — PROGRESS NOTES
Follow up with PCP for routine physical.    Counseled on benefits of having a primary care provider which includes, but is not limited to, continuity of care and having a medical home when concerns arise. Also enforced that onsite clinic policy states that we are not to take the place of a primary care provider, pt verbalized understanding.     SEs and risk vs benefits associated with medications prescribed discussed with patient who verbalized understanding. Pt verbalized understanding and agreement with plan of care. RTC for persisting/worsening symptoms or new complaints that arise. Discussed signs and symptoms that would warrant immediate evaluation including, but not limited to HA, blurred vision, speech disturbance, difficulty with ambulation/gait, numbness, tingling, weakness, syncope, chest pain, or shortness of breath.    I have reviewed the patient's medication list, past medical, family, social, and surgical history in detail and updated the patient record appropriately.    Dina Cervantes, APRN - CNP

## 2024-01-19 ASSESSMENT — ENCOUNTER SYMPTOMS
RHINORRHEA: 1
SWOLLEN GLANDS: 0
SORE THROAT: 1
ALLERGIC/IMMUNOLOGIC NEGATIVE: 1
VOMITING: 0
NAUSEA: 0
SHORTNESS OF BREATH: 0
CHEST TIGHTNESS: 0
SINUS PRESSURE: 0
ABDOMINAL PAIN: 0
WHEEZING: 0
SINUS PAIN: 0
EYES NEGATIVE: 1
DIARRHEA: 0
COUGH: 1

## 2024-04-11 ENCOUNTER — HOSPITAL ENCOUNTER (OUTPATIENT)
Dept: MAMMOGRAPHY | Age: 59
Discharge: HOME | End: 2024-04-11
Payer: COMMERCIAL

## 2024-04-11 VITALS — BODY MASS INDEX: 24.78 KG/M2 | WEIGHT: 177 LBS | HEIGHT: 71 IN

## 2024-04-11 PROCEDURE — 77063 BREAST TOMOSYNTHESIS BI: CPT

## 2024-04-11 PROCEDURE — 77067 SCR MAMMO BI INCL CAD: CPT

## 2024-05-29 ENCOUNTER — OFFICE VISIT (OUTPATIENT)
Age: 59
End: 2024-05-29

## 2024-05-29 DIAGNOSIS — L23.9 ALLERGIC CONTACT DERMATITIS, UNSPECIFIED TRIGGER: Primary | ICD-10-CM

## 2024-05-29 RX ORDER — TRIAMCINOLONE ACETONIDE 5 MG/G
CREAM TOPICAL
Qty: 15 G | Refills: 0 | Status: SHIPPED | OUTPATIENT
Start: 2024-05-29 | End: 2024-06-05

## 2024-05-30 NOTE — PROGRESS NOTES
PROGRESS NOTE    SUBJECTIVE:     Neha Shrestha is a 58 y.o. female seen for:    Chief Complaint    Rash       Patient complains of rash on left ankle that started yesterday while at work. The rash is red, raised, and very itchy. Associated symptoms include left ankle swelling, but no pain. Patient unsure if she was bit by an insect. She has applied OTC hydrocortisone cream once without much improvement. No other questions or concerns.     Current Outpatient Medications   Medication Sig Dispense Refill    triamcinolone (ARISTOCORT) 0.5 % cream Apply topically to affected area of skin on left ankle twice daily for 7 days 15 g 0    vitamin D (ERGOCALCIFEROL) 1.25 MG (75765 UT) CAPS capsule Take 1 capsule by mouth once a week (Patient not taking: Reported on 1/17/2024) 12 capsule 1     No current facility-administered medications for this visit.      No Known Allergies    Social History     Tobacco Use    Smoking status: Former     Current packs/day: 0.00     Types: Cigarettes     Quit date: 1/1/2014     Years since quitting: 10.4    Smokeless tobacco: Never   Vaping Use    Vaping Use: Never used   Substance Use Topics    Alcohol use: No      Review of Systems   Skin:  Positive for rash.   All other systems reviewed and are negative.    OBJECTIVE:    Physical Exam  Constitutional:       General: She is not in acute distress.     Appearance: Normal appearance. She is not ill-appearing, toxic-appearing or diaphoretic.   Pulmonary:      Effort: Pulmonary effort is normal.   Skin:     General: Skin is warm and dry.             Comments: Erythematous macular papular rash on left lateral malleolus. There is mild non-pitting edema noted. No punctum, blisters, drainage, or warmth noted.    Neurological:      General: No focal deficit present.      Mental Status: She is alert and oriented to person, place, and time.   Psychiatric:         Mood and Affect: Mood normal.         Behavior: Behavior normal.     ASSESSMENT and

## 2024-10-23 ENCOUNTER — OFFICE VISIT (OUTPATIENT)
Age: 59
End: 2024-10-23

## 2024-10-23 VITALS
DIASTOLIC BLOOD PRESSURE: 80 MMHG | SYSTOLIC BLOOD PRESSURE: 118 MMHG | TEMPERATURE: 98.3 F | HEART RATE: 95 BPM | OXYGEN SATURATION: 98 % | RESPIRATION RATE: 14 BRPM

## 2024-10-23 DIAGNOSIS — J30.2 SEASONAL ALLERGIC RHINITIS, UNSPECIFIED TRIGGER: Primary | ICD-10-CM

## 2024-10-23 DIAGNOSIS — J02.9 SORE THROAT: ICD-10-CM

## 2024-10-23 RX ORDER — CETIRIZINE HYDROCHLORIDE 10 MG/1
10 TABLET ORAL DAILY
COMMUNITY

## 2024-10-23 RX ORDER — BROMPHENIRAMINE MALEATE, PSEUDOEPHEDRINE HYDROCHLORIDE, AND DEXTROMETHORPHAN HYDROBROMIDE 2; 30; 10 MG/5ML; MG/5ML; MG/5ML
5 SYRUP ORAL 4 TIMES DAILY PRN
Qty: 120 ML | Refills: 0 | Status: SHIPPED | OUTPATIENT
Start: 2024-10-23

## 2024-10-23 ASSESSMENT — ENCOUNTER SYMPTOMS
COUGH: 1
SORE THROAT: 1
RHINORRHEA: 0
CHOKING: 0

## 2024-10-23 NOTE — PROGRESS NOTES
Turbinates: Swollen.      Left Turbinates: Swollen.      Mouth/Throat:      Pharynx: Posterior oropharyngeal erythema and postnasal drip present.      Comments: Cobblestone   Cardiovascular:      Rate and Rhythm: Normal rate and regular rhythm.   Lymphadenopathy:      Head:      Right side of head: No submandibular adenopathy.      Left side of head: No submandibular adenopathy.      Cervical: No cervical adenopathy.   Neurological:      Mental Status: She is alert.         ASSESSMENT and PLAN    Diagnoses and all orders for this visit:    Seasonal allergic rhinitis, unspecified trigger  -     brompheniramine-pseudoephedrine-DM 2-30-10 MG/5ML syrup; Take 5 mLs by mouth 4 times daily as needed for Congestion    Sore throat  -     AMB POC COVID-19 COV      Covid test completed in clinic is negative. May use flonase for symptoms. Stop taking sudafed while on the prescription medication. Continue taking zyrtec.   The best thing you can do is take care of yourself while your body heals. Care tips include:  Rest.  Drink plenty of liquids.  Humidify the air.  Use saline nasal rinses.  May use OTC Tylenol, Mortin, cold medications.       SEs and risk vs benefits associated with medications prescribed discussed with patient who verbalized understanding. Pt verbalized understanding and agreement with plan of care. RTC for persisting/worsening symptoms or new complaints that arise. Discussed signs and symptoms that would warrant immediate evaluation including, but not limited to HA, blurred vision, speech disturbance, difficulty with ambulation/gait, numbness, tingling, weakness, syncope, chest pain, or shortness of breath.    I have reviewed the patient's medication list, past medical, family, social, and surgical history in detail and updated the patient record appropriately.    Serena Alberts, APRN - CNP

## 2025-04-10 ENCOUNTER — HOSPITAL ENCOUNTER (OUTPATIENT)
Dept: MAMMOGRAPHY | Age: 60
Discharge: HOME | End: 2025-04-13
Payer: COMMERCIAL

## 2025-04-10 VITALS — BODY MASS INDEX: 24.78 KG/M2 | WEIGHT: 177 LBS | HEIGHT: 71 IN

## 2025-04-10 DIAGNOSIS — Z12.31 ENCOUNTER FOR SCREENING MAMMOGRAM FOR BREAST CANCER: ICD-10-CM

## 2025-04-10 PROCEDURE — 77063 BREAST TOMOSYNTHESIS BI: CPT

## 2025-07-14 ENCOUNTER — OFFICE VISIT (OUTPATIENT)
Age: 60
End: 2025-07-14

## 2025-07-14 DIAGNOSIS — Z13.220 SCREENING FOR LIPOID DISORDERS: ICD-10-CM

## 2025-07-14 DIAGNOSIS — Z13.1 SCREENING FOR DIABETES MELLITUS: ICD-10-CM

## 2025-07-14 DIAGNOSIS — Z13.1 SCREENING FOR DIABETES MELLITUS: Primary | ICD-10-CM

## 2025-07-16 LAB
CHOLEST SERPL-MCNC: 229 MG/DL (ref 100–199)
HBA1C MFR BLD: 5.9 % (ref 4.8–5.6)
HDLC SERPL-MCNC: 60 MG/DL
LDLC SERPL CALC-MCNC: 136 MG/DL (ref 0–99)
SPECIMEN STATUS REPORT: NORMAL
TRIGL SERPL-MCNC: 188 MG/DL (ref 0–149)
VLDLC SERPL CALC-MCNC: 33 MG/DL (ref 5–40)